# Patient Record
Sex: FEMALE | Race: WHITE | URBAN - METROPOLITAN AREA
[De-identification: names, ages, dates, MRNs, and addresses within clinical notes are randomized per-mention and may not be internally consistent; named-entity substitution may affect disease eponyms.]

---

## 2024-01-25 ENCOUNTER — TRANSCRIBE ORDERS (OUTPATIENT)
Dept: ADMINISTRATIVE | Age: 44
End: 2024-01-25

## 2024-01-25 DIAGNOSIS — R10.9 ABDOMINAL PAIN, UNSPECIFIED ABDOMINAL LOCATION: Primary | ICD-10-CM

## 2024-01-31 ENCOUNTER — HOSPITAL ENCOUNTER (OUTPATIENT)
Dept: ULTRASOUND IMAGING | Age: 44
Discharge: HOME | End: 2024-01-31
Attending: NURSE PRACTITIONER
Payer: COMMERCIAL

## 2024-01-31 DIAGNOSIS — R10.9 ABDOMINAL PAIN, UNSPECIFIED ABDOMINAL LOCATION: ICD-10-CM

## 2024-01-31 PROCEDURE — 76830 TRANSVAGINAL US NON-OB: CPT

## 2025-04-19 ENCOUNTER — APPOINTMENT (OUTPATIENT)
Dept: GENERAL RADIOLOGY | Age: 45
End: 2025-04-19
Payer: COMMERCIAL

## 2025-04-19 ENCOUNTER — HOSPITAL ENCOUNTER (EMERGENCY)
Age: 45
Discharge: HOME | End: 2025-04-19
Attending: EMERGENCY MEDICINE
Payer: COMMERCIAL

## 2025-04-19 VITALS
BODY MASS INDEX: 43.19 KG/M2 | RESPIRATION RATE: 18 BRPM | WEIGHT: 285 LBS | OXYGEN SATURATION: 98 % | DIASTOLIC BLOOD PRESSURE: 78 MMHG | TEMPERATURE: 98.5 F | HEIGHT: 68 IN | SYSTOLIC BLOOD PRESSURE: 180 MMHG | HEART RATE: 82 BPM

## 2025-04-19 DIAGNOSIS — M54.31 RIGHT SIDED SCIATICA: ICD-10-CM

## 2025-04-19 DIAGNOSIS — S39.012A BACK STRAIN, INITIAL ENCOUNTER: Primary | ICD-10-CM

## 2025-04-19 PROCEDURE — 72100 X-RAY EXAM L-S SPINE 2/3 VWS: CPT

## 2025-04-19 PROCEDURE — 72072 X-RAY EXAM THORAC SPINE 3VWS: CPT

## 2025-04-19 PROCEDURE — 99283 EMERGENCY DEPT VISIT LOW MDM: CPT

## 2025-04-19 PROCEDURE — 6370000000 HC RX 637 (ALT 250 FOR IP): Performed by: EMERGENCY MEDICINE

## 2025-04-19 RX ORDER — LISINOPRIL 10 MG/1
10 TABLET ORAL DAILY
COMMUNITY

## 2025-04-19 RX ORDER — MONTELUKAST SODIUM 10 MG/1
10 TABLET ORAL NIGHTLY
COMMUNITY

## 2025-04-19 RX ORDER — CYCLOBENZAPRINE HCL 10 MG
10 TABLET ORAL ONCE
Status: COMPLETED | OUTPATIENT
Start: 2025-04-19 | End: 2025-04-19

## 2025-04-19 RX ORDER — CYCLOBENZAPRINE HCL 10 MG
10 TABLET ORAL 3 TIMES DAILY PRN
Qty: 15 TABLET | Refills: 0 | Status: SHIPPED | OUTPATIENT
Start: 2025-04-19 | End: 2025-04-29

## 2025-04-19 RX ADMIN — CYCLOBENZAPRINE 10 MG: 10 TABLET, FILM COATED ORAL at 21:25

## 2025-04-19 ASSESSMENT — PAIN - FUNCTIONAL ASSESSMENT: PAIN_FUNCTIONAL_ASSESSMENT: 0-10

## 2025-04-19 ASSESSMENT — PAIN SCALES - GENERAL: PAINLEVEL_OUTOF10: 4

## 2025-04-19 NOTE — ED NOTES
Pt presents with c/o right lower back pain that started around 1630 today after helping lift a patient at work and the bed was not locked and the bed moved and patient tweaked her back, pain started to get worse in her shift so she was told to come here to be evaluated, pt alert and oriented, able to ambulate to room independently without difficulty, spouse at the bedside, ice pack applied, this is a workmans comp injury, patient works at [employer].

## 2025-04-20 NOTE — DISCHARGE INSTRUCTIONS
Over-the-counter pain medication as needed.  Take cyclobenzaprine 5 to 10 mg every 8 hours as needed for muscle spasms.  Gentle range of motion, no heavy lifting, pushing, pulling.  Follow-up with occupational medicine on Monday as scheduled.

## 2025-04-20 NOTE — DISCHARGE INSTR - COC
Continuity of Care Form    Patient Name: Lu Gaytan   :  1980  MRN:  [id number]    Admit date:  2025  Discharge date:  ***    Code Status Order: No Order   Advance Directives:     Admitting Physician:  No admitting provider for patient encounter.  PCP: Arnaud Gao MD    Discharging Nurse: ***  Discharging Hospital Unit/Room#: E2/E2  Discharging Unit Phone Number: ***    Emergency Contact:   Extended Emergency Contact Information  Primary Emergency Contact: Arianna Lofton  Home Phone: [phone]  Relation: Parent    Past Surgical History:  History reviewed. No pertinent surgical history.    Immunization History:     There is no immunization history on file for this patient.    Active Problems:  There is no problem list on file for this patient.      Isolation/Infection:   Isolation            No Isolation          Patient Infection Status    None to display         Nurse Assessment:  Last Vital Signs: BP (!) 180/78   Pulse 82   Temp 98.5 °F (36.9 °C) (Oral)   Resp 18   Ht 1.727 m (5' 8\")   Wt 129.3 kg (285 lb)   LMP 2025 (Approximate)   SpO2 98%   BMI 43.33 kg/m²     Last documented pain score (0-10 scale): Pain Level: 4  Last Weight:   Wt Readings from Last 1 Encounters:   25 129.3 kg (285 lb)     Mental Status:  {IP PT MENTAL STATUS:}    IV Access:  { BLUE IV ACCESS:443532567}    Nursing Mobility/ADLs:  Walking   {CHP DME ADLs:680087346}  Transfer  {CHP DME ADLs:186160755}  Bathing  {CHP DME ADLs:353773909}  Dressing  {CHP DME ADLs:049681898}  Toileting  {CHP DME ADLs:470429565}  Feeding  {CHP DME ADLs:822866920}  Med Admin  {CHP DME ADLs:875903717}  Med Delivery   { BLUE MED Delivery:658900718}    Wound Care Documentation and Therapy:        Elimination:  Continence:   Bowel: {YES / NO:}  Bladder: {YES / NO:}  Urinary Catheter: {Urinary Catheter:311831403}   Colostomy/Ileostomy/Ileal Conduit: {YES / NO:}       Date of Last BM: ***  No intake or

## 2025-04-20 NOTE — ED PROVIDER NOTES
[facility]  eMERGENCY dEPARTMENT eNCOUnter             [facility] CARE [place]    CHIEF COMPLAINT    Chief Complaint   Patient presents with    Back Pain     Lower back pain since 1630, was helping lifting a patient and the bed moved and pt tweaked her back       Nurses Notes reviewed and I agree except as noted in the HPI.    HPI    Lu Gaytan is a 45 y.o. 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  She has pain in her right thoracic and lumbar areas, fairly diffuse, increased with movement.  The pain radiates down the back of her right leg, to the level of her 1st and 2nd toes, with initial mild numbness in the 1st and 2nd toes.  The pain has worsened as time went on.  No treatment tried prior to arrival.  She denies previous back problems.  Pain is 4/10, aching, constant.  Increased pain with movement, changes of position.  No loss of continence of bowel or bladder.    REVIEW OF SYSTEMS      Review of Systems   Constitutional: Negative.    Musculoskeletal:  Negative for falls and neck pain.   Neurological:  Negative for focal weakness.   All other systems reviewed and are negative.        PAST MEDICAL HISTORY     has no past medical history on file.    SURGICAL HISTORY     has no past surgical history on file.    CURRENT MEDICATIONS    Discharge Medication List as of 4/19/2025  9:22 PM        CONTINUE these medications which have NOT CHANGED    Details   lisinopril (PRINIVIL;ZESTRIL) 10 MG tablet Take 1 tablet by mouth dailyHistorical Med      montelukast (SINGULAIR) 10 MG tablet Take 1 tablet by mouth nightlyHistorical Med             ALLERGIES    has no known allergies.    FAMILY HISTORY    has no family status information on file.    family history is not on file.    SOCIAL HISTORY     reports that she has never smoked. She has never used smokeless tobacco. She